# Patient Record
Sex: MALE | Race: WHITE | NOT HISPANIC OR LATINO | Employment: FULL TIME | URBAN - METROPOLITAN AREA
[De-identification: names, ages, dates, MRNs, and addresses within clinical notes are randomized per-mention and may not be internally consistent; named-entity substitution may affect disease eponyms.]

---

## 2022-07-07 ENCOUNTER — OFFICE VISIT (OUTPATIENT)
Dept: URGENT CARE | Facility: CLINIC | Age: 22
End: 2022-07-07
Payer: COMMERCIAL

## 2022-07-07 VITALS
RESPIRATION RATE: 18 BRPM | WEIGHT: 115.75 LBS | OXYGEN SATURATION: 98 % | DIASTOLIC BLOOD PRESSURE: 76 MMHG | HEART RATE: 86 BPM | HEIGHT: 65 IN | TEMPERATURE: 99 F | SYSTOLIC BLOOD PRESSURE: 130 MMHG | BODY MASS INDEX: 19.28 KG/M2

## 2022-07-07 DIAGNOSIS — Z11.52 ENCOUNTER FOR SCREENING FOR COVID-19: ICD-10-CM

## 2022-07-07 DIAGNOSIS — R11.0 NAUSEA: ICD-10-CM

## 2022-07-07 DIAGNOSIS — R53.83 FATIGUE, UNSPECIFIED TYPE: ICD-10-CM

## 2022-07-07 DIAGNOSIS — H66.90 OTITIS MEDIA, UNSPECIFIED LATERALITY, UNSPECIFIED OTITIS MEDIA TYPE: Primary | ICD-10-CM

## 2022-07-07 DIAGNOSIS — R09.81 NASAL CONGESTION: ICD-10-CM

## 2022-07-07 LAB
CTP QC/QA: YES
CTP QC/QA: YES
FLUAV AG NPH QL: NEGATIVE
FLUBV AG NPH QL: NEGATIVE
SARS-COV-2 RDRP RESP QL NAA+PROBE: NEGATIVE

## 2022-07-07 PROCEDURE — 87804 INFLUENZA ASSAY W/OPTIC: CPT | Mod: PBBFAC

## 2022-07-07 PROCEDURE — U0002 COVID-19 LAB TEST NON-CDC: HCPCS | Mod: PBBFAC

## 2022-07-07 PROCEDURE — 99213 OFFICE O/P EST LOW 20 MIN: CPT | Mod: S$PBB,,,

## 2022-07-07 PROCEDURE — 99204 OFFICE O/P NEW MOD 45 MIN: CPT | Mod: PBBFAC

## 2022-07-07 PROCEDURE — 99213 PR OFFICE/OUTPT VISIT, EST, LEVL III, 20-29 MIN: ICD-10-PCS | Mod: S$PBB,,,

## 2022-07-07 RX ORDER — ONDANSETRON 4 MG/1
8 TABLET, ORALLY DISINTEGRATING ORAL EVERY 8 HOURS PRN
Qty: 15 TABLET | Refills: 0 | Status: SHIPPED | OUTPATIENT
Start: 2022-07-07 | End: 2022-07-12

## 2022-07-07 RX ORDER — FLUTICASONE PROPIONATE 50 MCG
1 SPRAY, SUSPENSION (ML) NASAL DAILY
Qty: 9.9 ML | Refills: 0 | Status: SHIPPED | OUTPATIENT
Start: 2022-07-07

## 2022-07-07 RX ORDER — AMOXICILLIN AND CLAVULANATE POTASSIUM 875; 125 MG/1; MG/1
1 TABLET, FILM COATED ORAL EVERY 12 HOURS
Qty: 14 TABLET | Refills: 0 | Status: SHIPPED | OUTPATIENT
Start: 2022-07-07 | End: 2022-07-14

## 2022-07-07 NOTE — LETTER
July 7, 2022      [facility]  [address] [address]  Phone: [phone]       Patient: Santos Jesus   YOB: 2000  Date of Visit: 07/07/2022    To Whom It May Concern:    Domonique Jesus  was at [facility] on 07/07/2022. The patient may return to work/school on July 10 2022 with no  restrictions. If you have any questions or concerns, or if I can be of further assistance, please do not hesitate to contact me.    Sincerely,    THANIA PARKER NP

## 2022-07-08 NOTE — PROGRESS NOTES
"Subjective:       Patient ID: Santos Jesus is a 21 y.o. male.    Vitals:  height is 5' 4.96" (1.65 m) and weight is 52.5 kg (115 lb 11.9 oz). His temperature is 99 °F (37.2 °C). His blood pressure is 130/76 and his pulse is 86. His respiration is 18 and oxygen saturation is 98%.     Chief Complaint: Nasal Congestion (Had a slight fever 2 days ago, been very fatigued and nauseous and now slightly congested. - Entered by patient) and Fatigue (x2days)    Pt states starting to feel sick about 2 days ago with nausea, followed by fatigue, low grade fever, and ear pain. Pt has a hx of multiple ear infections and tubes. Pt has scaring on ear drums from tubes. Pt denies SOB, wheezing, or diarrhea. States one episode of vomiting that has since resolved.     Fatigue  This is a new problem. The current episode started in the past 7 days. Associated symptoms include congestion, coughing, fatigue, a fever, nausea and a sore throat. Nothing aggravates the symptoms. He has tried nothing for the symptoms.   Otalgia   There is pain in both ears. This is a new problem. The current episode started in the past 7 days. The problem occurs constantly. The problem has been gradually worsening. The pain is at a severity of 4/10. The pain is moderate. Associated symptoms include coughing and a sore throat. He has tried nothing for the symptoms. His past medical history is significant for a chronic ear infection and a tympanostomy tube.   Nausea  This is a new problem. The current episode started in the past 7 days. The problem occurs intermittently. The problem has been gradually improving. Associated symptoms include congestion, coughing, fatigue, a fever, nausea and a sore throat. Nothing aggravates the symptoms. He has tried nothing for the symptoms.       Constitution: Positive for fatigue and fever.   HENT: Positive for ear pain, congestion and sore throat.    Respiratory: Positive for cough.    Gastrointestinal: Positive for nausea. "   Genitourinary: Negative.    Skin: Negative.    Allergic/Immunologic: Negative.    Neurological: Negative.    Hematologic/Lymphatic: Negative.    Psychiatric/Behavioral: Negative.        Objective:      Physical Exam   Constitutional: He is cooperative. normal  HENT:   Head: Normocephalic.   Ears:   Right Ear: Ear canal normal. Tympanic membrane is scarred.   Left Ear: Ear canal normal. Tympanic membrane is scarred and bulging.   Ears:    Nose: Nose normal.   Mouth/Throat: Mucous membranes are moist. Oropharynx is clear.   Eyes: Pupils are equal, round, and reactive to light.   Cardiovascular: Normal rate, regular rhythm and normal pulses.   Pulmonary/Chest: Effort normal.   Abdominal: Normal appearance. Soft.   Musculoskeletal: Normal range of motion.         General: Normal range of motion.   Neurological: He is alert.   Skin: Skin is warm and dry.   Psychiatric: Mood normal.   Vitals reviewed.    Results for orders placed or performed in visit on 07/07/22   POCT COVID-19 Rapid Screening   Result Value Ref Range    POC Rapid COVID Negative Negative     Acceptable Yes    POCT Influenza A/B   Result Value Ref Range    Rapid Influenza A Ag Negative Negative    Rapid Influenza B Ag Negative Negative     Acceptable Yes      Vitals:    07/07/22 1849   BP: 130/76   Pulse: 86   Resp: 18   Temp: 99 °F (37.2 °C)           Assessment:       1. Otitis media, unspecified laterality, unspecified otitis media type    2. Encounter for screening for COVID-19    3. Fatigue, unspecified type    4. Nausea    5. Nasal congestion          Plan:         Otitis media, unspecified laterality, unspecified otitis media type  -     amoxicillin-clavulanate 875-125mg (AUGMENTIN) 875-125 mg per tablet; Take 1 tablet by mouth every 12 (twelve) hours. for 7 days  Dispense: 14 tablet; Refill: 0    Encounter for screening for COVID-19  -     POCT COVID-19 Rapid Screening    Fatigue, unspecified type  -     POCT  COVID-19 Rapid Screening  -     POCT Influenza A/B    Nausea  -     ondansetron (ZOFRAN-ODT) 4 MG TbDL; Take 2 tablets (8 mg total) by mouth every 8 (eight) hours as needed (nausea).  Dispense: 15 tablet; Refill: 0    Nasal congestion  -     fluticasone propionate (FLONASE) 50 mcg/actuation nasal spray; 1 spray (50 mcg total) by Each Nostril route once daily.  Dispense: 9.9 mL; Refill: 0         - OTC cold/flu products as desired for symptoms  - Plenty of fluids  - Home from work/school  - Tylenol or Motrin for pain/fever  -     - Continue daily allergy meds.  - Start antibiotic today for ar infections.      Go to the ER if you experience chest pain with SOB, SOBOE, high fevers 103+, excessive vomiting/diarrhea, or general distress.